# Patient Record
Sex: FEMALE | URBAN - METROPOLITAN AREA
[De-identification: names, ages, dates, MRNs, and addresses within clinical notes are randomized per-mention and may not be internally consistent; named-entity substitution may affect disease eponyms.]

---

## 2021-08-17 ENCOUNTER — APPOINTMENT (RX ONLY)
Dept: URBAN - METROPOLITAN AREA CLINIC 46 | Facility: CLINIC | Age: 36
Setting detail: DERMATOLOGY
End: 2021-08-17

## 2021-08-17 DIAGNOSIS — L91.0 HYPERTROPHIC SCAR: ICD-10-CM

## 2021-08-17 PROCEDURE — 99243 OFF/OP CNSLTJ NEW/EST LOW 30: CPT

## 2021-08-17 PROCEDURE — ? PRESCRIPTION

## 2021-08-17 PROCEDURE — ? DEFER

## 2021-08-17 PROCEDURE — ? COUNSELING

## 2021-08-17 RX ORDER — AZELAIC ACID 0.15 G/G
GEL TOPICAL BID
Qty: 1 | Refills: 0 | Status: ERX | COMMUNITY
Start: 2021-08-17

## 2021-08-17 RX ORDER — DIFLORASONE DIACETATE 0.5 MG/G
OINTMENT TOPICAL BID
Qty: 1 | Refills: 0 | Status: ERX | COMMUNITY
Start: 2021-08-17

## 2021-08-17 RX ADMIN — AZELAIC ACID: 0.15 GEL TOPICAL at 00:00

## 2021-08-17 RX ADMIN — DIFLORASONE DIACETATE: 0.5 OINTMENT TOPICAL at 00:00

## 2021-08-17 NOTE — PROCEDURE: DEFER
Procedure To Be Performed At Next Visit: Intralesional Kenalog
Introduction Text (Please End With A Colon): Location: left knee \\nPlan: Intralesional kenalog [id number] x [id number] x 1
Detail Level: Detailed

## 2021-08-19 ENCOUNTER — RX ONLY (OUTPATIENT)
Age: 36
Setting detail: RX ONLY
End: 2021-08-19

## 2021-08-19 RX ORDER — AZELAIC ACID 0.15 G/G
GEL TOPICAL BID
Qty: 1 | Refills: 0 | Status: ERX

## 2021-08-19 RX ORDER — DIFLORASONE DIACETATE 0.5 MG/G
OINTMENT TOPICAL BID
Qty: 1 | Refills: 0 | Status: ERX

## 2022-09-07 ENCOUNTER — APPOINTMENT (RX ONLY)
Dept: URBAN - METROPOLITAN AREA CLINIC 46 | Facility: CLINIC | Age: 37
Setting detail: DERMATOLOGY
End: 2022-09-07

## 2022-09-07 DIAGNOSIS — L91.0 HYPERTROPHIC SCAR: ICD-10-CM | Status: RESOLVED

## 2022-09-07 DIAGNOSIS — L81.0 POSTINFLAMMATORY HYPERPIGMENTATION: ICD-10-CM

## 2022-09-07 PROCEDURE — ? OBSERVATION

## 2022-09-07 PROCEDURE — ? PRESCRIPTION

## 2022-09-07 PROCEDURE — 99213 OFFICE O/P EST LOW 20 MIN: CPT

## 2022-09-07 PROCEDURE — ? COUNSELING

## 2022-09-07 PROCEDURE — ? OTHER

## 2022-09-07 ASSESSMENT — LOCATION SIMPLE DESCRIPTION DERM: LOCATION SIMPLE: LEFT KNEE

## 2022-09-07 ASSESSMENT — LOCATION DETAILED DESCRIPTION DERM: LOCATION DETAILED: LEFT KNEE

## 2022-09-07 ASSESSMENT — LOCATION ZONE DERM: LOCATION ZONE: LEG

## 2022-09-07 NOTE — PROCEDURE: OTHER
Render Risk Assessment In Note?: no
Other (Free Text): Improved following consistent use of topical steroids
Detail Level: Zone
Note Text (......Xxx Chief Complaint.): This diagnosis correlates with the